# Patient Record
Sex: FEMALE | Race: BLACK OR AFRICAN AMERICAN | NOT HISPANIC OR LATINO | Employment: FULL TIME | URBAN - METROPOLITAN AREA
[De-identification: names, ages, dates, MRNs, and addresses within clinical notes are randomized per-mention and may not be internally consistent; named-entity substitution may affect disease eponyms.]

---

## 2024-06-21 DIAGNOSIS — M25.561 RIGHT KNEE PAIN: Primary | ICD-10-CM

## 2024-06-21 DIAGNOSIS — M25.569 PAIN IN JOINT, LOWER LEG: ICD-10-CM

## 2024-07-16 DIAGNOSIS — M25.561 RIGHT KNEE PAIN: Primary | ICD-10-CM

## 2024-11-14 ENCOUNTER — LAB VISIT (OUTPATIENT)
Dept: LAB | Facility: HOSPITAL | Age: 44
End: 2024-11-14
Attending: ORTHOPAEDIC SURGERY
Payer: COMMERCIAL

## 2024-11-14 ENCOUNTER — OFFICE VISIT (OUTPATIENT)
Dept: ORTHOPEDICS | Facility: CLINIC | Age: 44
End: 2024-11-14
Payer: COMMERCIAL

## 2024-11-14 ENCOUNTER — HOSPITAL ENCOUNTER (OUTPATIENT)
Dept: RADIOLOGY | Facility: CLINIC | Age: 44
Discharge: HOME | End: 2024-11-14
Attending: ORTHOPAEDIC SURGERY
Payer: COMMERCIAL

## 2024-11-14 VITALS
WEIGHT: 214 LBS | HEIGHT: 66 IN | SYSTOLIC BLOOD PRESSURE: 120 MMHG | HEART RATE: 89 BPM | BODY MASS INDEX: 34.39 KG/M2 | DIASTOLIC BLOOD PRESSURE: 85 MMHG

## 2024-11-14 DIAGNOSIS — G89.29 CHRONIC PAIN OF BOTH KNEES: ICD-10-CM

## 2024-11-14 DIAGNOSIS — M25.561 RIGHT KNEE PAIN: ICD-10-CM

## 2024-11-14 DIAGNOSIS — M25.561 CHRONIC PAIN OF BOTH KNEES: ICD-10-CM

## 2024-11-14 DIAGNOSIS — M25.562 CHRONIC PAIN OF BOTH KNEES: ICD-10-CM

## 2024-11-14 DIAGNOSIS — T84.012A FAILED TOTAL RIGHT KNEE REPLACEMENT, INITIAL ENCOUNTER: ICD-10-CM

## 2024-11-14 DIAGNOSIS — M25.561 RIGHT KNEE PAIN, UNSPECIFIED CHRONICITY: Primary | ICD-10-CM

## 2024-11-14 LAB
ANISOCYTOSIS BLD QL SMEAR: ABNORMAL
BASOPHILS # BLD AUTO: 0.01 X10(3)/MCL
BASOPHILS NFR BLD AUTO: 0.1 %
CRP SERPL HS-MCNC: 3.66 MG/L
EOSINOPHIL # BLD AUTO: 0.07 X10(3)/MCL (ref 0–0.9)
EOSINOPHIL NFR BLD AUTO: 1 %
ERYTHROCYTE [DISTWIDTH] IN BLOOD BY AUTOMATED COUNT: 15.9 % (ref 11.5–17)
ERYTHROCYTE [SEDIMENTATION RATE] IN BLOOD: 59 MM/HR (ref 0–20)
HCT VFR BLD AUTO: 36.2 % (ref 37–47)
HGB BLD-MCNC: 10.3 G/DL (ref 12–16)
HYPOCHROMIA BLD QL SMEAR: ABNORMAL
IMM GRANULOCYTES # BLD AUTO: 0.02 X10(3)/MCL (ref 0–0.04)
IMM GRANULOCYTES NFR BLD AUTO: 0.3 %
LYMPHOCYTES # BLD AUTO: 1.61 X10(3)/MCL (ref 0.6–4.6)
LYMPHOCYTES NFR BLD AUTO: 23.9 %
MCH RBC QN AUTO: 22.9 PG (ref 27–31)
MCHC RBC AUTO-ENTMCNC: 28.5 G/DL (ref 33–36)
MCV RBC AUTO: 80.6 FL (ref 80–94)
MICROCYTES BLD QL SMEAR: ABNORMAL
MONOCYTES # BLD AUTO: 0.46 X10(3)/MCL (ref 0.1–1.3)
MONOCYTES NFR BLD AUTO: 6.8 %
NEUTROPHILS # BLD AUTO: 4.56 X10(3)/MCL (ref 2.1–9.2)
NEUTROPHILS NFR BLD AUTO: 67.9 %
NRBC BLD AUTO-RTO: 0 %
PLATELET # BLD AUTO: 304 X10(3)/MCL (ref 130–400)
PLATELET # BLD EST: ADEQUATE 10*3/UL
PMV BLD AUTO: 10.5 FL (ref 7.4–10.4)
RBC # BLD AUTO: 4.49 X10(6)/MCL (ref 4.2–5.4)
RBC MORPH BLD: ABNORMAL
WBC # BLD AUTO: 6.73 X10(3)/MCL (ref 4.5–11.5)

## 2024-11-14 PROCEDURE — 85025 COMPLETE CBC W/AUTO DIFF WBC: CPT

## 2024-11-14 PROCEDURE — 36415 COLL VENOUS BLD VENIPUNCTURE: CPT

## 2024-11-14 PROCEDURE — 73564 X-RAY EXAM KNEE 4 OR MORE: CPT | Mod: ,,, | Performed by: ORTHOPAEDIC SURGERY

## 2024-11-14 PROCEDURE — 85652 RBC SED RATE AUTOMATED: CPT

## 2024-11-14 PROCEDURE — 86141 C-REACTIVE PROTEIN HS: CPT

## 2024-11-14 RX ORDER — HYDROCODONE BITARTRATE AND ACETAMINOPHEN 5; 325 MG/1; MG/1
1 TABLET ORAL EVERY 6 HOURS PRN
COMMUNITY
Start: 2024-08-12

## 2024-11-14 RX ORDER — ONDANSETRON 4 MG/1
TABLET, FILM COATED ORAL
COMMUNITY
Start: 2024-01-10

## 2024-11-14 RX ORDER — CELECOXIB 200 MG/1
200 CAPSULE ORAL
COMMUNITY
Start: 2024-05-06

## 2024-11-14 RX ORDER — FERROUS SULFATE 325(65) MG
TABLET ORAL
COMMUNITY

## 2024-11-14 RX ORDER — IBUPROFEN 800 MG/1
TABLET ORAL
COMMUNITY

## 2024-11-14 RX ORDER — ERGOCALCIFEROL 1.25 MG/1
CAPSULE ORAL
COMMUNITY
Start: 2024-10-23

## 2024-11-14 RX ORDER — INDOMETHACIN 50 MG/1
1 CAPSULE ORAL 2 TIMES DAILY
COMMUNITY
Start: 2024-04-22

## 2024-11-14 RX ORDER — TRIAMCINOLONE ACETONIDE 1 MG/G
CREAM TOPICAL
COMMUNITY

## 2024-11-14 RX ORDER — LINACLOTIDE 290 UG/1
CAPSULE, GELATIN COATED ORAL
COMMUNITY

## 2024-11-14 RX ORDER — DULOXETIN HYDROCHLORIDE 30 MG/1
CAPSULE, DELAYED RELEASE ORAL
COMMUNITY

## 2024-11-14 RX ORDER — GABAPENTIN 300 MG/1
1 CAPSULE ORAL NIGHTLY
COMMUNITY
Start: 2024-03-27

## 2024-11-14 RX ORDER — MOMETASONE FUROATE 1 MG/G
CREAM TOPICAL
COMMUNITY
Start: 2024-10-23

## 2024-11-14 RX ORDER — HYDROCHLOROTHIAZIDE 12.5 MG/1
CAPSULE ORAL
COMMUNITY

## 2024-11-14 RX ORDER — ALBUTEROL SULFATE 90 UG/1
POWDER, METERED RESPIRATORY (INHALATION)
COMMUNITY

## 2024-11-14 NOTE — PROGRESS NOTES
Chief Complaint:   Chief Complaint   Patient presents with    Right Knee - Pain, Swelling     Pt has hx  of Rt TKA and manipulation this year. Pt states that after the second surgery she had a broken bone in the calf and was told that she needed to let it heal itself. Pt can't feel her 4 toes and the lateral of the calf. Operative note in the chart.    Left Knee - Pain, Swelling     Pt states she is experiencing the same symptoms she had in the Rt knee prior surgery. Pt reports swelling post physical activities and constant pain.        History of present illness:    History of Present Illness  The patient is a 44-year-old female who presents for evaluation of right knee pain.    She reports experiencing heterotopic ossification, numbness, and pain in her right knee. She underwent knee surgery on 01/10/2024, followed by a manipulation procedure on 03/13/2024. Prior to the surgery, she experienced swelling in the same area due to an injury sustained in 07/2024, which was the initial cause of the swelling. Post-manipulation, she has lost sensation in four of her toes. She has not attended therapy sessions as she found them unhelpful. A CT scan revealed a broken bone. She also reports a clicking sensation in her knee when walking. She experiences shooting pain down her leg, which she can feel in her toes. She received an injection in her left knee in 07/2024, which provided relief for about a week.    She has been having the same herniated disc for years.    History reviewed. No pertinent past medical history.    Past Surgical History:   Procedure Laterality Date    KNEE JOINT MANIPULATION Right     TOTAL KNEE ARTHROPLASTY Right        Current Outpatient Medications   Medication Sig    celecoxib (CELEBREX) 200 MG capsule Take 200 mg by mouth.    DULoxetine (CYMBALTA) 30 MG capsule Take 1 capsule every day by oral route for 30 days.    ergocalciferol (ERGOCALCIFEROL) 50,000 unit Cap Take 1 capsule every week by oral  route.    ferrous sulfate (FEOSOL) 325 mg (65 mg iron) Tab tablet Take 1 tablet every day by oral route as directed for 30 days.    gabapentin (NEURONTIN) 300 MG capsule Take 1 capsule by mouth every evening.    hydroCHLOROthiazide (MICROZIDE) 12.5 mg capsule     HYDROcodone-acetaminophen (NORCO) 5-325 mg per tablet Take 1 tablet by mouth every 6 (six) hours as needed.    ibuprofen (ADVIL,MOTRIN) 800 MG tablet     indomethacin (INDOCIN) 50 MG capsule Take 1 capsule by mouth 2 (two) times daily.    LINZESS 290 mcg Cap capsule Take 1 capsule every day by oral route for 30 days.    mometasone 0.1% (ELOCON) 0.1 % cream APPLY EXTERNALLY TO THE AFFECTED AREA AS DIRECTED EVERY DAY.    ondansetron (ZOFRAN) 4 MG tablet Take 2 tablets every 8 hours by oral route as needed.    PROAIR RESPICLICK 90 mcg/actuation inhaler Inhale 2 puffs every 4 hours by inhalation route as needed.    triamcinolone acetonide 0.1% (KENALOG) 0.1 % cream Apply 1 application twice a day by topical route as needed.     No current facility-administered medications for this visit.       Review of patient's allergies indicates:   Allergen Reactions    Iodinated contrast media Anaphylaxis, Hives, Itching, Rash and Shortness Of Breath    Iodine Anaphylaxis and Itching    Latex, natural rubber Anaphylaxis, Hives, Itching, Rash, Shortness Of Breath and Swelling    Moxifloxacin Anaphylaxis, Other (See Comments) and Swelling     Ruptured jaw tendon approximately 7 years ago    Nut - unspecified Swelling    Povidone-iodine Anaphylaxis, Hives, Rash and Swelling    Shellfish derived Anaphylaxis    Sutures Rash, Swelling and Edema    Egg extract Itching    Pecan nut Hives       Family History   Family history unknown: Yes       Social History     Socioeconomic History    Marital status: Single   Tobacco Use    Smoking status: Never    Smokeless tobacco: Never   Substance and Sexual Activity    Alcohol use: Never     Comment: Socially    Drug use: Never    Sexual  activity: Not Currently     Partners: Male     Social Drivers of Health     Food Insecurity: Patient Declined (11/8/2024)    Received from [facility] Vital Sign     Worried About Running Out of Food in the Last Year: Patient declined     Ran Out of Food in the Last Year: Patient declined           Review of Systems:    Constitution: Negative for chills, fever, and sweats.  Negative for unexplained weight loss.    HENT:  Negative for headaches and blurry vision.    Cardiovascular:Negative for chest pain or irregular heart beat. Negative for hypertension.    Respiratory:  Negative for cough and shortness of breath.    Gastrointestinal: Negative for abdominal pain, heartburn, melena, nausea, and vomitting.    Genitourinary:  Negative bladder incontinence and dysuria.    Musculoskeletal:  See HPI    Neurological: Negative for numbness.    Psychiatric/Behavioral: Negative for depression.  The patient is not nervous/anxious.      Endocrine: Negative for polyuria    Hematologic/Lymphatic: Negative for bleeding problem.  Does not bruise/bleed easily.    Skin: Negative for poor would healing and rash      Physical Examination:    Vital Signs:    Vitals:    11/14/24 1004   BP: 120/85   Pulse: 89       Body mass index is 34.54 kg/m².    General: No acute distress, alert and oriented, healthy appearing    HEENT: Head is atraumatic, mucous membranes are moist    Neck: Supples, no JVD    Cardiovascular: Palpable dorsalis pedis and posterior tibial pulses, regular rate and rhythm to those pulses    Lungs: Breathing non-labored    Skin: no rashes appreciated    Neurologic: Can flex and extend knees, ankles, and toes. Sensation is grossly intact    Right knee:  Brisk cap refill disappeared sensation intact disappeared range of motion of the right knee patient was it was range of motion from 0-95.  It was able to varus and valgus.  Stable anterior-posterior drawer.    X-rays:  Three views of the right knee reviewed.   Patient with well aligned well-fixed prosthesis.  It was a small amount of heterotopic ossification of the anterior lateral knee about her femur.  No significant signs of loosening.     Assessment::  Status post right total knee arthroplasty    Plan:  Discussed all treatment with the patient.  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  We will work her up for infection.  That it was see him back in 3 weeks after her blood work today.  Patient was also has peroneal nerve neuritis.  She relates this to the her manipulation.  It was be a fairly rare complication.  It was good it was strength in no drop foot.  It was an EMG which she was also getting at some point in time we will see the results of this in the zone any nerve compression.    This note was generated with the assistance of ambient listening technology. Verbal consent was obtained by the patient and accompanying visitor(s) for the recording of patient appointment to facilitate this note. I attest to having reviewed and edited the generated note for accuracy, though some syntax or spelling errors may persist. Please contact the author of this note for any clarification.      This note was created using [employer] voice recognition software that occasionally misinterpreted phrases or words.    Consult note is delivered via Epic messaging service.

## 2024-11-27 ENCOUNTER — OFFICE VISIT (OUTPATIENT)
Dept: ORTHOPEDICS | Facility: CLINIC | Age: 44
End: 2024-11-27
Payer: COMMERCIAL

## 2024-11-27 ENCOUNTER — TELEPHONE (OUTPATIENT)
Dept: ORTHOPEDICS | Facility: CLINIC | Age: 44
End: 2024-11-27

## 2024-11-27 VITALS
WEIGHT: 214.06 LBS | BODY MASS INDEX: 34.4 KG/M2 | DIASTOLIC BLOOD PRESSURE: 82 MMHG | SYSTOLIC BLOOD PRESSURE: 133 MMHG | HEIGHT: 66 IN

## 2024-11-27 DIAGNOSIS — T84.012A FAILED TOTAL RIGHT KNEE REPLACEMENT, INITIAL ENCOUNTER: Primary | ICD-10-CM

## 2024-11-27 LAB
GRAM STN SPEC: NORMAL
GRAM STN SPEC: NORMAL

## 2024-11-27 NOTE — PROGRESS NOTES
Chief Complaint:   Chief Complaint   Patient presents with    Right Knee - Follow-up     R. KNEE PAIN///Lab Results, wbat, ambulates without assistance, reports no changes,        History of present illness:    History of Present Illness  The patient presents for evaluation of bilateral knee swelling.    She reports persistent swelling in her knees, with the right knee being more problematic. She has a history of right knee replacement surgery.    History reviewed. No pertinent past medical history.    Past Surgical History:   Procedure Laterality Date    BREAST SURGERY  3/31/18     SECTION  99    JOINT REPLACEMENT  1/10/23    TKR (R)    KNEE JOINT MANIPULATION Right     TOTAL KNEE ARTHROPLASTY Right        Current Outpatient Medications   Medication Sig    celecoxib (CELEBREX) 200 MG capsule Take 200 mg by mouth.    DULoxetine (CYMBALTA) 30 MG capsule Take 1 capsule every day by oral route for 30 days.    ergocalciferol (ERGOCALCIFEROL) 50,000 unit Cap Take 1 capsule every week by oral route.    ferrous sulfate (FEOSOL) 325 mg (65 mg iron) Tab tablet Take 1 tablet every day by oral route as directed for 30 days.    gabapentin (NEURONTIN) 300 MG capsule Take 1 capsule by mouth every evening.    hydroCHLOROthiazide (MICROZIDE) 12.5 mg capsule     HYDROcodone-acetaminophen (NORCO) 5-325 mg per tablet Take 1 tablet by mouth every 6 (six) hours as needed.    ibuprofen (ADVIL,MOTRIN) 800 MG tablet     indomethacin (INDOCIN) 50 MG capsule Take 1 capsule by mouth 2 (two) times daily.    LINZESS 290 mcg Cap capsule Take 1 capsule every day by oral route for 30 days.    mometasone 0.1% (ELOCON) 0.1 % cream APPLY EXTERNALLY TO THE AFFECTED AREA AS DIRECTED EVERY DAY.    ondansetron (ZOFRAN) 4 MG tablet Take 2 tablets every 8 hours by oral route as needed.    PROAIR RESPICLICK 90 mcg/actuation inhaler Inhale 2 puffs every 4 hours by inhalation route as needed.    triamcinolone acetonide 0.1% (KENALOG) 0.1 % cream  Apply 1 application twice a day by topical route as needed.     No current facility-administered medications for this visit.       Review of patient's allergies indicates:   Allergen Reactions    Iodinated contrast media Anaphylaxis, Hives, Itching, Rash and Shortness Of Breath    Iodine Anaphylaxis and Itching    Latex, natural rubber Anaphylaxis, Hives, Itching, Rash, Shortness Of Breath and Swelling    Moxifloxacin Anaphylaxis, Other (See Comments) and Swelling     Ruptured jaw tendon approximately 7 years ago    Nut - unspecified Swelling    Povidone-iodine Anaphylaxis, Hives, Rash and Swelling    Shellfish derived Anaphylaxis    Sutures Rash, Swelling and Edema    Egg extract Itching    Pecan nut Hives       Family History   Problem Relation Name Age of Onset    Heart disease Mother N/a     Arthritis Paternal Grandmother Marlena More        Social History     Socioeconomic History    Marital status: Single   Tobacco Use    Smoking status: Never    Smokeless tobacco: Never   Substance and Sexual Activity    Alcohol use: Not Currently     Comment: Socially    Drug use: Never    Sexual activity: Yes     Partners: Female     Birth control/protection: None     Social Drivers of Health     Food Insecurity: Patient Declined (11/8/2024)    Received from [facility] Vital Sign     Worried About Running Out of Food in the Last Year: Patient declined     Ran Out of Food in the Last Year: Patient declined           Review of Systems:    Constitution: Negative for chills, fever, and sweats.  Negative for unexplained weight loss.    HENT:  Negative for headaches and blurry vision.    Cardiovascular:Negative for chest pain or irregular heart beat. Negative for hypertension.    Respiratory:  Negative for cough and shortness of breath.    Gastrointestinal: Negative for abdominal pain, heartburn, melena, nausea, and vomitting.    Genitourinary:  Negative bladder incontinence and dysuria.    Musculoskeletal:  See  HPI    Neurological: Negative for numbness.    Psychiatric/Behavioral: Negative for depression.  The patient is not nervous/anxious.      Endocrine: Negative for polyuria    Hematologic/Lymphatic: Negative for bleeding problem.  Does not bruise/bleed easily.    Skin: Negative for poor would healing and rash      Physical Examination:    Vital Signs:    Vitals:    11/27/24 0847   BP: 133/82       Body mass index is 34.55 kg/m².    General: No acute distress, alert and oriented, healthy appearing    HEENT: Head is atraumatic, mucous membranes are moist    Neck: Supples, no JVD    Cardiovascular: Palpable dorsalis pedis and posterior tibial pulses, regular rate and rhythm to those pulses    Lungs: Breathing non-labored    Skin: no rashes appreciated    Neurologic: Can flex and extend knees, ankles, and toes. Sensation is grossly intact    Right knee:  Patient continues to have some stiffness of the right knee.  She can get to extension of the has near full.  Flexion about 95°.  Continues to complain of pain.  Two to 3+ effusion of the right knee.    X-rays:      Assessment::  Painful right total knee arthroplasty    Plan:  Discussed all treatment options the patient we will continue to work her up for infection.  It was have elevated inflammatory markers.  Plan to do an aspiration today.    This note was generated with the assistance of ambient listening technology. Verbal consent was obtained by the patient and accompanying visitor(s) for the recording of patient appointment to facilitate this note. I attest to having reviewed and edited the generated note for accuracy, though some syntax or spelling errors may persist. Please contact the author of this note for any clarification.      This note was created using [employer] voice recognition software that occasionally misinterpreted phrases or words.    Consult note is delivered via Epic messaging service.

## 2024-11-27 NOTE — PROCEDURES
Large Joint Aspiration/Injection: R knee    Date/Time: 11/27/2024 8:45 AM    Performed by: Chidi Moseley MD  Authorized by: Chidi Moseley MD    Consent Done?:  Yes (Verbal)  Indications:  Diagnostic evaluation  Timeout: prior to procedure the correct patient, procedure, and site was verified    Prep: patient was prepped and draped in usual sterile fashion      Local anesthesia used?: Yes    Anesthesia:  Local infiltration  Local anesthetic:  Lidocaine 2% without epinephrine    Details:  Needle Size:  18 G  Ultrasonic Guidance for needle placement?: No    Approach:  Lateral  Location:  Knee  Site:  R knee  Aspirate amount (mL):  10  Aspirate:  Clear and serous  Lab: fluid sent for laboratory analysis    Patient tolerance:  Patient tolerated the procedure well with no immediate complications

## 2024-11-30 LAB
BACTERIA SPEC ANAEROBE CULT: NORMAL
BACTERIA TISS AEROBE CULT: NORMAL

## 2024-12-17 ENCOUNTER — OFFICE VISIT (OUTPATIENT)
Dept: ORTHOPEDICS | Facility: CLINIC | Age: 44
End: 2024-12-17
Payer: COMMERCIAL

## 2024-12-17 VITALS
WEIGHT: 210 LBS | HEART RATE: 81 BPM | SYSTOLIC BLOOD PRESSURE: 124 MMHG | DIASTOLIC BLOOD PRESSURE: 81 MMHG | HEIGHT: 66 IN | BODY MASS INDEX: 33.75 KG/M2

## 2024-12-17 DIAGNOSIS — T84.012D FAILED TOTAL RIGHT KNEE REPLACEMENT, SUBSEQUENT ENCOUNTER: Primary | ICD-10-CM

## 2024-12-17 PROCEDURE — 3079F DIAST BP 80-89 MM HG: CPT | Mod: CPTII,,, | Performed by: ORTHOPAEDIC SURGERY

## 2024-12-17 PROCEDURE — 3074F SYST BP LT 130 MM HG: CPT | Mod: CPTII,,, | Performed by: ORTHOPAEDIC SURGERY

## 2024-12-17 PROCEDURE — 1159F MED LIST DOCD IN RCRD: CPT | Mod: CPTII,,, | Performed by: ORTHOPAEDIC SURGERY

## 2024-12-17 PROCEDURE — 99214 OFFICE O/P EST MOD 30 MIN: CPT | Mod: ,,, | Performed by: ORTHOPAEDIC SURGERY

## 2024-12-17 PROCEDURE — 3008F BODY MASS INDEX DOCD: CPT | Mod: CPTII,,, | Performed by: ORTHOPAEDIC SURGERY

## 2024-12-17 NOTE — PROGRESS NOTES
Chief Complaint:   Chief Complaint   Patient presents with    Right Knee - Results     Right kneee Asp patient is having pain on today some throbbing pain radiates some and toes are numb. She take motrin       History of present illness:    History of Present Illness  The patient presents for evaluation of knee pain.    She reports experiencing mild throbbing pain in her knee, which extends to her entire leg. She underwent knee surgery on 2024, followed by subsequent manipulations. She expresses concern regarding the elevated inflammatory markers observed in her blood work. Additionally, she mentions having an oncologist who could potentially administer radiation therapy to the area. She also reports numbness in her toes, a symptom that was evaluated through a nerve conduction study.    History reviewed. No pertinent past medical history.    Past Surgical History:   Procedure Laterality Date    BREAST SURGERY  3/31/18     SECTION  99    JOINT REPLACEMENT  1/10/23    TKR (R)    KNEE JOINT MANIPULATION Right     TOTAL KNEE ARTHROPLASTY Right        Current Outpatient Medications   Medication Sig    celecoxib (CELEBREX) 200 MG capsule Take 200 mg by mouth.    DULoxetine (CYMBALTA) 30 MG capsule Take 1 capsule every day by oral route for 30 days.    ergocalciferol (ERGOCALCIFEROL) 50,000 unit Cap Take 1 capsule every week by oral route.    ferrous sulfate (FEOSOL) 325 mg (65 mg iron) Tab tablet Take 1 tablet every day by oral route as directed for 30 days.    gabapentin (NEURONTIN) 300 MG capsule Take 1 capsule by mouth every evening.    hydroCHLOROthiazide (MICROZIDE) 12.5 mg capsule     HYDROcodone-acetaminophen (NORCO) 5-325 mg per tablet Take 1 tablet by mouth every 6 (six) hours as needed.    ibuprofen (ADVIL,MOTRIN) 800 MG tablet     indomethacin (INDOCIN) 50 MG capsule Take 1 capsule by mouth 2 (two) times daily.    LINZESS 290 mcg Cap capsule Take 1 capsule every day by oral route for 30  days.    mometasone 0.1% (ELOCON) 0.1 % cream APPLY EXTERNALLY TO THE AFFECTED AREA AS DIRECTED EVERY DAY.    ondansetron (ZOFRAN) 4 MG tablet Take 2 tablets every 8 hours by oral route as needed.    PROAIR RESPICLICK 90 mcg/actuation inhaler Inhale 2 puffs every 4 hours by inhalation route as needed.    triamcinolone acetonide 0.1% (KENALOG) 0.1 % cream Apply 1 application twice a day by topical route as needed.     No current facility-administered medications for this visit.       Review of patient's allergies indicates:   Allergen Reactions    Iodinated contrast media Anaphylaxis, Hives, Itching, Rash and Shortness Of Breath    Iodine Anaphylaxis and Itching    Latex, natural rubber Anaphylaxis, Hives, Itching, Rash, Shortness Of Breath and Swelling    Moxifloxacin Anaphylaxis, Other (See Comments) and Swelling     Ruptured jaw tendon approximately 7 years ago    Nut - unspecified Swelling    Povidone-iodine Anaphylaxis, Hives, Rash and Swelling    Shellfish derived Anaphylaxis    Sutures Rash, Swelling and Edema    Egg extract Itching    Pecan nut Hives       Family History   Problem Relation Name Age of Onset    Heart disease Mother N/a     Arthritis Paternal Grandmother Marlena More        Social History     Socioeconomic History    Marital status: Single   Tobacco Use    Smoking status: Never    Smokeless tobacco: Never   Substance and Sexual Activity    Alcohol use: Not Currently     Comment: Socially    Drug use: Never    Sexual activity: Yes     Partners: Female     Birth control/protection: None     Social Drivers of Health     Food Insecurity: Patient Declined (11/8/2024)    Received from [facility] Vital Sign     Worried About Running Out of Food in the Last Year: Patient declined     Ran Out of Food in the Last Year: Patient declined           Review of Systems:    Constitution: Negative for chills, fever, and sweats.  Negative for unexplained weight loss.    HENT:  Negative for  headaches and blurry vision.    Cardiovascular:Negative for chest pain or irregular heart beat. Negative for hypertension.    Respiratory:  Negative for cough and shortness of breath.    Gastrointestinal: Negative for abdominal pain, heartburn, melena, nausea, and vomitting.    Genitourinary:  Negative bladder incontinence and dysuria.    Musculoskeletal:  See HPI    Neurological: Negative for numbness.    Psychiatric/Behavioral: Negative for depression.  The patient is not nervous/anxious.      Endocrine: Negative for polyuria    Hematologic/Lymphatic: Negative for bleeding problem.  Does not bruise/bleed easily.    Skin: Negative for poor would healing and rash      Physical Examination:    Vital Signs:    Vitals:    12/17/24 0856   BP: 124/81   Pulse: 81       Body mass index is 33.89 kg/m².    General: No acute distress, alert and oriented, healthy appearing    HEENT: Head is atraumatic, mucous membranes are moist    Neck: Supples, no JVD    Cardiovascular: Palpable dorsalis pedis and posterior tibial pulses, regular rate and rhythm to those pulses    Lungs: Breathing non-labored    Skin: no rashes appreciated    Neurologic: Can flex and extend knees, ankles, and toes. Sensation is grossly intact    Right knee:  Patient's right knee continues to be stiff.  0 to about 95.  Brisk cap refill disappeared sensation intact distally.  Range of motion was unchanged previous.    X-rays:      Assessment::  Status post right total knee arthroplasty    Plan:  Patient has significant pain.  Likely related to use some arthrofibrosis.  She is negative for infection.  We had discussed that given her young age, revision although possible, we would put her down a pathway of needing multiple surgeries.  It would time.  She continues to have numbness of the tingling.  She is scheduled for nerve conduction study very near future.  She is going to bring this with the next appointment.  We will give this a full year after her surgery  to recover.  See her back in 3 months.  Repeat x-rays of the right knee when she returns.    This note was generated with the assistance of ambient listening technology. Verbal consent was obtained by the patient and accompanying visitor(s) for the recording of patient appointment to facilitate this note. I attest to having reviewed and edited the generated note for accuracy, though some syntax or spelling errors may persist. Please contact the author of this note for any clarification.      This note was created using [employer] voice recognition software that occasionally misinterpreted phrases or words.    Consult note is delivered via Epic messaging service.

## 2025-03-18 ENCOUNTER — HOSPITAL ENCOUNTER (OUTPATIENT)
Dept: RADIOLOGY | Facility: CLINIC | Age: 45
Discharge: HOME | End: 2025-03-18
Attending: ORTHOPAEDIC SURGERY
Payer: COMMERCIAL

## 2025-03-18 ENCOUNTER — OFFICE VISIT (OUTPATIENT)
Dept: ORTHOPEDICS | Facility: CLINIC | Age: 45
End: 2025-03-18
Payer: COMMERCIAL

## 2025-03-18 VITALS
HEART RATE: 79 BPM | DIASTOLIC BLOOD PRESSURE: 79 MMHG | HEIGHT: 66 IN | WEIGHT: 216 LBS | SYSTOLIC BLOOD PRESSURE: 127 MMHG | BODY MASS INDEX: 34.72 KG/M2

## 2025-03-18 DIAGNOSIS — M25.561 CHRONIC PAIN OF BOTH KNEES: ICD-10-CM

## 2025-03-18 DIAGNOSIS — M17.12 PRIMARY OSTEOARTHRITIS OF LEFT KNEE: ICD-10-CM

## 2025-03-18 DIAGNOSIS — T84.012D FAILED TOTAL RIGHT KNEE REPLACEMENT, SUBSEQUENT ENCOUNTER: Primary | ICD-10-CM

## 2025-03-18 DIAGNOSIS — G89.29 CHRONIC PAIN OF BOTH KNEES: ICD-10-CM

## 2025-03-18 DIAGNOSIS — M25.562 CHRONIC PAIN OF BOTH KNEES: ICD-10-CM

## 2025-03-18 DIAGNOSIS — G57.31 SUPERFICIAL PERONEAL NERVE NEUROPATHY, RIGHT: ICD-10-CM

## 2025-03-18 PROCEDURE — 1159F MED LIST DOCD IN RCRD: CPT | Mod: CPTII,,, | Performed by: ORTHOPAEDIC SURGERY

## 2025-03-18 PROCEDURE — 3078F DIAST BP <80 MM HG: CPT | Mod: CPTII,,, | Performed by: ORTHOPAEDIC SURGERY

## 2025-03-18 PROCEDURE — 99214 OFFICE O/P EST MOD 30 MIN: CPT | Mod: 25,,, | Performed by: ORTHOPAEDIC SURGERY

## 2025-03-18 PROCEDURE — 20610 DRAIN/INJ JOINT/BURSA W/O US: CPT | Mod: LT,,, | Performed by: ORTHOPAEDIC SURGERY

## 2025-03-18 PROCEDURE — 3074F SYST BP LT 130 MM HG: CPT | Mod: CPTII,,, | Performed by: ORTHOPAEDIC SURGERY

## 2025-03-18 PROCEDURE — 3008F BODY MASS INDEX DOCD: CPT | Mod: CPTII,,, | Performed by: ORTHOPAEDIC SURGERY

## 2025-03-18 PROCEDURE — 73564 X-RAY EXAM KNEE 4 OR MORE: CPT | Mod: ,,, | Performed by: ORTHOPAEDIC SURGERY

## 2025-03-18 RX ORDER — BETAMETHASONE SODIUM PHOSPHATE AND BETAMETHASONE ACETATE 3; 3 MG/ML; MG/ML
12 INJECTION, SUSPENSION INTRA-ARTICULAR; INTRALESIONAL; INTRAMUSCULAR; SOFT TISSUE
Status: DISCONTINUED | OUTPATIENT
Start: 2025-03-18 | End: 2025-03-18 | Stop reason: HOSPADM

## 2025-03-18 RX ORDER — LIDOCAINE HYDROCHLORIDE 20 MG/ML
5 INJECTION, SOLUTION EPIDURAL; INFILTRATION; INTRACAUDAL; PERINEURAL
Status: DISCONTINUED | OUTPATIENT
Start: 2025-03-18 | End: 2025-03-18 | Stop reason: HOSPADM

## 2025-03-18 RX ADMIN — LIDOCAINE HYDROCHLORIDE 5 ML: 20 INJECTION, SOLUTION EPIDURAL; INFILTRATION; INTRACAUDAL; PERINEURAL at 08:03

## 2025-03-18 RX ADMIN — BETAMETHASONE SODIUM PHOSPHATE AND BETAMETHASONE ACETATE 12 MG: 3; 3 INJECTION, SUSPENSION INTRA-ARTICULAR; INTRALESIONAL; INTRAMUSCULAR; SOFT TISSUE at 08:03

## 2025-03-18 NOTE — PROCEDURES
Large Joint Aspiration/Injection: L knee    Date/Time: 3/18/2025 8:00 AM    Performed by: Chidi Moseley MD  Authorized by: Cihdi Moseley MD    Consent Done?:  Yes (Verbal)  Indications:  Arthritis and pain  Timeout: prior to procedure the correct patient, procedure, and site was verified    Prep: patient was prepped and draped in usual sterile fashion      Details:  Needle Size:  22 G  Approach:  Anterolateral  Location:  Knee  Site:  L knee  Medications:  5 mL LIDOcaine (PF) 20 mg/mL (2%) 20 mg/mL (2 %); 12 mg betamethasone acetate-betamethasone sodium phosphate 6 mg/mL

## 2025-03-18 NOTE — PROGRESS NOTES
Chief Complaint:   Chief Complaint   Patient presents with    Right Knee - Follow-up     Pt states she has been experiencing pain in both knees. Right knee feels better than before surgery. Left knee started hurting and swelling up constantly since January. Pt is requesting bilateral x-rays today even though we have one from .        History of present illness:    History of Present Illness  The patient presents for evaluation of bilateral knee pain and numbness in toes 2 through 5 on the right foot.    He reports that his left knee has begun to exhibit similar symptoms to those previously experienced in his right knee. He is uncertain about the administration of any injections to the left knee in the past.    Despite it being a year since his right knee procedure, he continues to experience issues, including persistent numbness in toes 2 through 5. He has not undergone a nerve conduction study. Initially, it was suggested that the numbness originated from his back, but this was later refuted by a back specialist. An MRI of the foot has been recommended due to the ongoing numbness. The onset of the numbness was noted post-manipulation, following the administration of a block.    History reviewed. No pertinent past medical history.    Past Surgical History:   Procedure Laterality Date    BREAST SURGERY  3/31/18     SECTION  99    JOINT REPLACEMENT  1/10/23    TKR (R)    KNEE JOINT MANIPULATION Right     TOTAL KNEE ARTHROPLASTY Right        Current Outpatient Medications   Medication Sig    celecoxib (CELEBREX) 200 MG capsule Take 200 mg by mouth.    collagen/biotin/ascorbic acid (COLLAGEN 1500 PLUS C ORAL) Take by mouth.    DULoxetine (CYMBALTA) 30 MG capsule Take 1 capsule every day by oral route for 30 days.    ergocalciferol (ERGOCALCIFEROL) 50,000 unit Cap Take 1 capsule every week by oral route.    ferrous sulfate (FEOSOL) 325 mg (65 mg iron) Tab tablet Take 1 tablet every day by oral route as  directed for 30 days.    gabapentin (NEURONTIN) 300 MG capsule Take 1 capsule by mouth every evening.    hydroCHLOROthiazide (MICROZIDE) 12.5 mg capsule     HYDROcodone-acetaminophen (NORCO) 5-325 mg per tablet Take 1 tablet by mouth every 6 (six) hours as needed.    ibuprofen (ADVIL,MOTRIN) 800 MG tablet     indomethacin (INDOCIN) 50 MG capsule Take 1 capsule by mouth 2 (two) times daily.    LINZESS 290 mcg Cap capsule Take 1 capsule every day by oral route for 30 days.    mometasone 0.1% (ELOCON) 0.1 % cream APPLY EXTERNALLY TO THE AFFECTED AREA AS DIRECTED EVERY DAY.    ondansetron (ZOFRAN) 4 MG tablet Take 2 tablets every 8 hours by oral route as needed.    PROAIR RESPICLICK 90 mcg/actuation inhaler Inhale 2 puffs every 4 hours by inhalation route as needed.    triamcinolone acetonide 0.1% (KENALOG) 0.1 % cream Apply 1 application twice a day by topical route as needed.     No current facility-administered medications for this visit.       Review of patient's allergies indicates:   Allergen Reactions    Iodinated contrast media Anaphylaxis, Hives, Itching, Rash and Shortness Of Breath    Iodine Anaphylaxis and Itching    Latex, natural rubber Anaphylaxis, Hives, Itching, Rash, Shortness Of Breath and Swelling    Moxifloxacin Anaphylaxis, Other (See Comments) and Swelling     Ruptured jaw tendon approximately 7 years ago    Nut - unspecified Swelling    Povidone-iodine Anaphylaxis, Hives, Rash and Swelling    Shellfish derived Anaphylaxis    Sutures Rash, Swelling and Edema    Egg extract Itching    Pecan nut Hives       Family History   Problem Relation Name Age of Onset    Heart disease Mother N/a     Arthritis Paternal Grandmother Marlena More        Social History[1]        Review of Systems:    Constitution: Negative for chills, fever, and sweats.  Negative for unexplained weight loss.    HENT:  Negative for headaches and blurry vision.    Cardiovascular:Negative for chest pain or irregular heart beat.  Negative for hypertension.    Respiratory:  Negative for cough and shortness of breath.    Gastrointestinal: Negative for abdominal pain, heartburn, melena, nausea, and vomitting.    Genitourinary:  Negative bladder incontinence and dysuria.    Musculoskeletal:  See HPI    Neurological: Negative for numbness.    Psychiatric/Behavioral: Negative for depression.  The patient is not nervous/anxious.      Endocrine: Negative for polyuria    Hematologic/Lymphatic: Negative for bleeding problem.  Does not bruise/bleed easily.    Skin: Negative for poor would healing and rash      Physical Examination:    Vital Signs:    Vitals:    03/18/25 0816   BP: 127/79   Pulse: 79       Body mass index is 34.86 kg/m².    General: No acute distress, alert and oriented, healthy appearing    HEENT: Head is atraumatic, mucous membranes are moist    Neck: Supples, no JVD    Cardiovascular: Palpable dorsalis pedis and posterior tibial pulses, regular rate and rhythm to those pulses    Lungs: Breathing non-labored    Skin: no rashes appreciated    Neurologic: Can flex and extend knees, ankles, and toes.     Right knee:  Right knee is basically unchanged.  She gets extension that has basically full.  Flexion about 95 maybe 100°.  Left knee shows varus alignment that has not correctable.  Tenderness of the medial joint line.  Brisk cap refill disappeared sensation intact distally.  Crepitus range of motion noted of the left knee.  Patient has decreased sensation to the right foot laterally, this has mainly superficial peroneal distribution appears.  Has a positive Tinel's as superior peroneal nerve of the ankle as well as over the peroneal nerve at the proximal tibia/distal knee    X-rays:  Views of bilateral knees reviewed today.  Patient's implants in the right side are well fixed.  They were overall well aligned.  That has signs of loosening or subsidence noted.  Left knee shows end-stage osteoarthritis of the medial joint line  patellofemoral joint.  Kellgren Carlos Alberto grade 4 changes to the left knee.     Assessment::  Left knee osteoarthritis, painful right total knee arthroplasty, superficial peroneal compression versus nerve injury    Plan:  Plan to give her an injection of the left knee today to temporize this.  With regards to the right knee, we will plan to do nothing.  She says her knee is stable.  She is not wish any further intervention.  There was no worsening.  She would like to get an EMG to evaluate for areas of compression of the superficial peroneal nerve or the peroneal nerve proper that occurred after her manipulation per the patient.    This note was generated with the assistance of ambient listening technology. Verbal consent was obtained by the patient and accompanying visitor(s) for the recording of patient appointment to facilitate this note. I attest to having reviewed and edited the generated note for accuracy, though some syntax or spelling errors may persist. Please contact the author of this note for any clarification.      This note was created using [employer] voice recognition software that occasionally misinterpreted phrases or words.    Consult note is delivered via Epic messaging service.         [1]   Social History  Socioeconomic History    Marital status: Single   Tobacco Use    Smoking status: Never    Smokeless tobacco: Never   Substance and Sexual Activity    Alcohol use: Not Currently     Comment: Socially    Drug use: Never    Sexual activity: Yes     Partners: Female     Birth control/protection: None     Social Drivers of Health     Food Insecurity: Food Insecurity Present (2/10/2025)    Received from [facility] Vital Sign     Worried About Running Out of Food in the Last Year: Sometimes true     Ran Out of Food in the Last Year: Sometimes true

## 2025-06-04 ENCOUNTER — OFFICE VISIT (OUTPATIENT)
Dept: NEUROLOGY | Facility: CLINIC | Age: 45
End: 2025-06-04
Payer: COMMERCIAL

## 2025-06-04 VITALS
HEIGHT: 66 IN | BODY MASS INDEX: 34.39 KG/M2 | DIASTOLIC BLOOD PRESSURE: 80 MMHG | WEIGHT: 214 LBS | SYSTOLIC BLOOD PRESSURE: 134 MMHG

## 2025-06-04 DIAGNOSIS — L90.5 SCAR PAIN: Primary | ICD-10-CM

## 2025-06-04 DIAGNOSIS — G57.31 SUPERFICIAL PERONEAL NERVE NEUROPATHY, RIGHT: ICD-10-CM

## 2025-06-04 PROCEDURE — 99999 PR PBB SHADOW E&M-EST. PATIENT-LVL IV: CPT | Mod: PBBFAC,,, | Performed by: PSYCHIATRY & NEUROLOGY

## 2025-06-04 RX ORDER — LIDOCAINE HYDROCHLORIDE 20 MG/ML
20 INJECTION, SOLUTION EPIDURAL; INFILTRATION; INTRACAUDAL; PERINEURAL
Status: COMPLETED | OUTPATIENT
Start: 2025-06-04 | End: 2025-06-04

## 2025-06-04 RX ORDER — TRIAMCINOLONE ACETONIDE 40 MG/ML
40 INJECTION, SUSPENSION INTRA-ARTICULAR; INTRAMUSCULAR ONCE
Status: COMPLETED | OUTPATIENT
Start: 2025-06-04 | End: 2025-06-04

## 2025-06-04 RX ADMIN — TRIAMCINOLONE ACETONIDE 40 MG: 40 INJECTION, SUSPENSION INTRA-ARTICULAR; INTRAMUSCULAR at 10:06

## 2025-06-04 RX ADMIN — LIDOCAINE HYDROCHLORIDE 400 MG: 20 INJECTION, SOLUTION EPIDURAL; INFILTRATION; INTRACAUDAL; PERINEURAL at 09:06
